# Patient Record
Sex: MALE | Race: WHITE | NOT HISPANIC OR LATINO | Employment: OTHER | URBAN - METROPOLITAN AREA
[De-identification: names, ages, dates, MRNs, and addresses within clinical notes are randomized per-mention and may not be internally consistent; named-entity substitution may affect disease eponyms.]

---

## 2019-08-09 ENCOUNTER — CATARACT CONSULT (OUTPATIENT)
Dept: URBAN - METROPOLITAN AREA CLINIC 35 | Facility: CLINIC | Age: 84
End: 2019-08-09

## 2019-08-09 DIAGNOSIS — H04.123: ICD-10-CM

## 2019-08-09 DIAGNOSIS — H25.811: ICD-10-CM

## 2019-08-09 DIAGNOSIS — H25.812: ICD-10-CM

## 2019-08-09 PROCEDURE — 92004 COMPRE OPH EXAM NEW PT 1/>: CPT

## 2019-08-09 PROCEDURE — 92015 DETERMINE REFRACTIVE STATE: CPT

## 2019-08-09 ASSESSMENT — VISUAL ACUITY
OS_CC: J6
OS_CC: 20/50-2
OD_CC: J6
OD_CC: 20/30-2
OS_SC: 20/60
OD_SC: 20/40-2

## 2019-08-09 ASSESSMENT — TONOMETRY
OS_IOP_MMHG: 15
OD_IOP_MMHG: 14

## 2019-12-02 ENCOUNTER — CATARACT CONSULT (OUTPATIENT)
Dept: URBAN - METROPOLITAN AREA CLINIC 35 | Facility: CLINIC | Age: 84
End: 2019-12-02

## 2019-12-02 DIAGNOSIS — H25.811: ICD-10-CM

## 2019-12-02 DIAGNOSIS — H04.123: ICD-10-CM

## 2019-12-02 DIAGNOSIS — H25.812: ICD-10-CM

## 2019-12-02 PROCEDURE — 92015 DETERMINE REFRACTIVE STATE: CPT

## 2019-12-02 PROCEDURE — V2799PMN IMPRIMIS PRED-MOXI-NEPAF 5ML

## 2019-12-02 PROCEDURE — 92014 COMPRE OPH EXAM EST PT 1/>: CPT

## 2019-12-02 ASSESSMENT — TONOMETRY
OS_IOP_MMHG: 20
OD_IOP_MMHG: 21

## 2019-12-02 ASSESSMENT — VISUAL ACUITY
OD_SC: 20/40
OD_CC: J3
OS_SC: 20/60-2
OS_CC: J4
OS_SC: J8
OD_SC: J8

## 2020-01-23 ENCOUNTER — H&P (OUTPATIENT)
Dept: URBAN - METROPOLITAN AREA CLINIC 39 | Facility: CLINIC | Age: 85
End: 2020-01-23

## 2020-01-23 ENCOUNTER — SURGERY/PROCEDURE (OUTPATIENT)
Dept: URBAN - METROPOLITAN AREA SURGERY 14 | Facility: SURGERY | Age: 85
End: 2020-01-23

## 2020-01-23 DIAGNOSIS — H25.811: ICD-10-CM

## 2020-01-23 DIAGNOSIS — H25.812: ICD-10-CM

## 2020-01-23 PROCEDURE — 66984 XCAPSL CTRC RMVL W/O ECP: CPT

## 2020-01-23 PROCEDURE — 99211T TECH SERVICE

## 2020-01-24 ENCOUNTER — CATARACT POST-OP 1-DAY (OUTPATIENT)
Dept: URBAN - METROPOLITAN AREA CLINIC 35 | Facility: CLINIC | Age: 85
End: 2020-01-24

## 2020-01-24 DIAGNOSIS — Z96.1: ICD-10-CM

## 2020-01-24 DIAGNOSIS — H25.811: ICD-10-CM

## 2020-01-24 PROCEDURE — 99024 POSTOP FOLLOW-UP VISIT: CPT

## 2020-01-24 ASSESSMENT — TONOMETRY: OS_IOP_MMHG: 21

## 2020-01-24 ASSESSMENT — VISUAL ACUITY: OD_SC: 20/40-2

## 2020-01-30 ENCOUNTER — SURGERY/PROCEDURE (OUTPATIENT)
Dept: URBAN - METROPOLITAN AREA SURGERY 14 | Facility: SURGERY | Age: 85
End: 2020-01-30

## 2020-01-30 ENCOUNTER — POST OP/EVAL OF SECOND EYE (OUTPATIENT)
Dept: URBAN - METROPOLITAN AREA SURGERY 14 | Facility: SURGERY | Age: 85
End: 2020-01-30

## 2020-01-30 DIAGNOSIS — H25.811: ICD-10-CM

## 2020-01-30 PROCEDURE — 92012 INTRM OPH EXAM EST PATIENT: CPT

## 2020-01-30 PROCEDURE — 66984 XCAPSL CTRC RMVL W/O ECP: CPT

## 2020-01-30 ASSESSMENT — TONOMETRY
OS_IOP_MMHG: 15
OD_IOP_MMHG: 15

## 2020-01-30 ASSESSMENT — VISUAL ACUITY
OD_BAT: 20/100 WITH MR
OD_SC: 20/40
OS_SC: 20/30+2

## 2020-01-31 ENCOUNTER — CATARACT POST-OP 1-DAY (OUTPATIENT)
Dept: URBAN - METROPOLITAN AREA CLINIC 39 | Facility: CLINIC | Age: 85
End: 2020-01-31

## 2020-01-31 DIAGNOSIS — Z96.1: ICD-10-CM

## 2020-01-31 PROCEDURE — 99024 POSTOP FOLLOW-UP VISIT: CPT

## 2020-01-31 ASSESSMENT — TONOMETRY: OD_IOP_MMHG: 10

## 2020-01-31 ASSESSMENT — VISUAL ACUITY: OD_SC: 20/40

## 2020-02-14 ENCOUNTER — POST-OP CATARACT (OUTPATIENT)
Dept: URBAN - METROPOLITAN AREA CLINIC 39 | Facility: CLINIC | Age: 85
End: 2020-02-14

## 2020-02-14 DIAGNOSIS — Z96.1: ICD-10-CM

## 2020-02-14 PROCEDURE — 99024 POSTOP FOLLOW-UP VISIT: CPT

## 2020-02-14 ASSESSMENT — VISUAL ACUITY
OS_SC: 20/25-1
OD_SC: J10
OS_SC: J10
OD_SC: 20/25-1

## 2020-02-14 ASSESSMENT — TONOMETRY
OD_IOP_MMHG: 14
OS_IOP_MMHG: 14

## 2020-06-03 ENCOUNTER — ESTABLISHED COMPREHENSIVE EXAM (OUTPATIENT)
Dept: URBAN - METROPOLITAN AREA CLINIC 35 | Facility: CLINIC | Age: 85
End: 2020-06-03

## 2020-06-03 DIAGNOSIS — H04.123: ICD-10-CM

## 2020-06-03 PROCEDURE — 92014 COMPRE OPH EXAM EST PT 1/>: CPT

## 2020-06-03 ASSESSMENT — TONOMETRY
OD_IOP_MMHG: 15
OS_IOP_MMHG: 16

## 2020-06-03 ASSESSMENT — VISUAL ACUITY
OS_SC: 20/25
OD_SC: 20/25
OS_CC: J1
OS_SC: J8
OD_SC: J8
OD_CC: J1

## 2023-01-05 NOTE — PATIENT DISCUSSION
Discussed AREDS supplements, BP Control, UV protection and dark leafy green vegetables. Continue Ocuvite use daily.

## 2025-04-04 ENCOUNTER — EMERGENCY VISIT (OUTPATIENT)
Age: OVER 89
End: 2025-04-04

## 2025-04-04 DIAGNOSIS — H02.132: ICD-10-CM

## 2025-04-04 DIAGNOSIS — H02.135: ICD-10-CM

## 2025-04-04 DIAGNOSIS — H16.223: ICD-10-CM

## 2025-04-04 PROCEDURE — 99203 OFFICE O/P NEW LOW 30 MIN: CPT

## 2025-04-23 ENCOUNTER — CONSULTATION/EVALUATION (OUTPATIENT)
Age: OVER 89
End: 2025-04-23

## 2025-04-23 DIAGNOSIS — H00.15: ICD-10-CM

## 2025-04-23 DIAGNOSIS — H02.132: ICD-10-CM

## 2025-04-23 DIAGNOSIS — H02.135: ICD-10-CM

## 2025-04-23 PROCEDURE — 99213 OFFICE O/P EST LOW 20 MIN: CPT

## 2025-04-23 PROCEDURE — 92285 EXTERNAL OCULAR PHOTOGRAPHY: CPT
